# Patient Record
(demographics unavailable — no encounter records)

---

## 2025-07-23 NOTE — HISTORY OF PRESENT ILLNESS
[Diabetes Mellitus] : Diabetes Mellitus [Diet] : controlled with diet [Oral] : controlled with oral diabetes medication [Hypertension] : Hypertension [FreeTextEntry1] : 77y Male from [facility] PMHx CAD (PCI to OM 2016), T2DM, HTN presented initially to [facility] ED on 7/9/25 with exertional chest pain. Patient reported he was experiencing exertional chest pain for the past few months. He was in [place] visiting family when chest pressure started again so family brought him in to [facility] ED. On arrival, troponins negative but EKG with T-wave inversion in III and V1. He was admitted to cardiology for chest pain work-up. Stress test was positive so on 7/10/25; subsequently, he had LHC, which demonstrated severe 3vCAD. 7/15/25 patient underwent CABGx3 (LIMA-LAD, SVG-OM, SVG-PDA). Arrived to CTICU extubated on levo. Mido added. POD 1, CT removed, post PTX seen on CT chest. Right pigtail chest tube placed for PTX. Persisting subQ air of anterior chest. Midodrine discontinued. 2uPRBC gave for Hbg 7 with appropriate response. POD 2, delined. Lopressor started. CXR with persisting bilateral ptx. Transferred to [facility]. POD3, CT was placed to water seal, CT Chest obtained, decision made to remove CT. Evening f/u CXR w/ large PTX and subcutaneous air; R anterior pigtail placed once more. Placed in HFNC overnight for comfort. POD4, remained with pigtail in place to suction with stable subq air. POD5, CT placed to water seal with increased facial and neck edema. CT was placed back on suction -40 overnight. POD6, CT placed to water seal. CT removed and pt discharged home 7/22/25. Mr. Anderson is recovering at his son's home in [place]. He is ambulating independently.  Pt verbalized incisional pain controlled with prn Acetaminophen. Pt slept well and has a good appetite.  Last BM today. He verbalized urinating frequently and denies LE swelling.  Pt does not have a glucose monitor here, left his back home in [facility].

## 2025-07-23 NOTE — REASON FOR VISIT
[Other Location: e.g. School (Enter Location, City,State)___] : at [unfilled], at the time of the visit. [Other Location: e.g. Home (Enter Location, City,State)___] : at [unfilled] [Telehealth (audio & video)] : This visit was provided via telehealth using real-time 2-way audio visual technology. [Verbal consent obtained from patient] : the patient, [unfilled] [Follow-Up: _____] : a [unfilled] follow-up visit [Spouse] : spouse [FreeTextEntry2] : 7/15/25

## 2025-07-23 NOTE — ASSESSMENT
[FreeTextEntry1] : S/p CABG x3- continue Asa, Atorvastatin, Furosemide, and Metoprolol Tartrate.  DM- continue consistent carb diet and Metformin. Will send Rx for glucose monitor to check blood glucose fasting daily.

## 2025-07-23 NOTE — PLAN
[TextEntry] : Post Operative Care:  Pt advised of importance of daily weights. Pt advised to call FY NP with weight gain of 3 lbs or more. Pt instructed on how to use incentive spirometer every hour, demonstrated proper use.  Pt encouraged to ambulate as much as tolerated, avoiding extreme temperatures outdoors.  Also advised to cleanse incisions daily with mild soap and water and to avoid lotions, powders, ointments or creams near or on the incision.  Low salt, low fat diet encouraged and discussed.  Pt advised to avoid heavy lifting or straining.     [facility] team will continue to follow up with pt status.  NP/CCC roles explained with pt understanding, contact information provided. Pt agrees to call with any questions, issues or concerns.  Worsening symptoms reviewed with patient understanding.      FOLLOW UP APPOINTMENTS:  CTS: Dr. Pickens appointment 7/28/25  CARDIOLOGIST: In [facility] follow up on return home  PCP: Pt encouraged to follow up within one month of discharge

## 2025-07-23 NOTE — PHYSICAL EXAM
[Sclera] : the sclera and conjunctiva were normal [PERRL With Normal Accommodation] : pupils were equal in size, round, and reactive to light [Neck Appearance] : the appearance of the neck was normal [Respiration, Rhythm And Depth] : normal respiratory rhythm and effort [Exaggerated Use Of Accessory Muscles For Inspiration] : no accessory muscle use [Examination Of The Chest] : the chest was normal in appearance [Chest Visual Inspection Thoracic Asymmetry] : no chest asymmetry [Diminished Respiratory Excursion] : normal chest expansion [Breast Appearance] : normal in appearance [Nail Clubbing] : no clubbing  or cyanosis of the fingernails [Involuntary Movements] : no involuntary movements were seen [Skin Color & Pigmentation] : normal skin color and pigmentation [Skin Turgor] : normal skin turgor [] : no rash [FreeTextEntry1] : right upper chest dressing with serosanguinous drainage noted on gauze. CT sites CDI. left SVG site CDI. B/L LE edema [No Focal Deficits] : no focal deficits [Oriented To Time, Place, And Person] : oriented to person, place, and time [Impaired Insight] : insight and judgment were intact [Affect] : the affect was normal [Mood] : the mood was normal [Memory Recent] : recent memory was not impaired [Memory Remote] : remote memory was not impaired

## 2025-07-29 NOTE — REASON FOR VISIT
[de-identified] : CABGx3 (LIMA-LAD, SVG-OM, SVG-PDA)  [de-identified] : 7/15/25 [de-identified] : 13 [de-identified] : Arrived to CTICU extubated on levo. Mido added. POD 1, CT removed, post PTX seen on CT chest. Right pigtail chest tube placed for PTX. Persisting subQ air of anterior chest. Midodrine discontinued. 2uPRBC gave for Hbg 7 with appropriate response. POD 2, delined. Lopressor started. CXR with persisting bilateral PTX. Transferred to [facility]. POD3, CT was placed to water seal, CT Chest obtained, decision made to remove CT. Evening f/u CXR revealed large PTX and subcutaneous air; R anterior pigtail placed once more. Placed in HFNC overnight for comfort. POD4, remained with pigtail in place to suction with stable subq air. POD5, CT placed to water seal with increased facial and neck edema. CT was placed back on suction -40 overnight. POD6, CT placed to water seal. F/u CXR revealed stable subcutaneous air. Remained on water seal overnight. POD7, AM CXR showed stable subq emphysema and CT was placed on clamp trial. After 2 hours, CXR revealed no progression in subq emphysema and CT was subsequently removed. Afternoon CXR revealed stable subq emphysema, reviewed with Dr. Haywood. Pacing wires were removed without arrhythmia or complication. Per Dr. Pickens, patient is hemodynamically stable to be discharged. Upon discharge, patient is eating well, tolerating pain, ambulating, and having bowel movements. Patient discharged home on 7/22/25.   Patient is recuperating well from surgery. He is ambulating and increasing his activities daily. Patient denies fever, chills, dizziness, syncope, shortness of breath, chest pain, palpitations or peripheral edema.  [Spouse] : spouse

## 2025-07-29 NOTE — ASSESSMENT
[FreeTextEntry1] : - Follow up with PCP/Cardiologist Dr. Ajay Vieyra.  - Continue current medication regimen. - Continue to increase activity and walk daily as tolerated. Continue to use incentive spirometer.  - No driving or strenuous activity for six weeks after surgery. Avoid lifting >10 to 15lbs for first two months after surgery.  - Continue to use compression stockings. Keep legs elevated above heart when resting/sitting/sleeping. - Call MD if you experience fever, fatigue, dizziness, confusion, syncope, shortness of breath, chest pain not relieved with analgesics, increased redness/drainage from incision. - Follow up in CTS clinic in 2 WEEKS WITH CHEST XRAY.

## 2025-07-29 NOTE — REASON FOR VISIT
[de-identified] : CABGx3 (LIMA-LAD, SVG-OM, SVG-PDA)  [de-identified] : 7/15/25 [de-identified] : 13 [de-identified] : Arrived to CTICU extubated on levo. Mido added. POD 1, CT removed, post PTX seen on CT chest. Right pigtail chest tube placed for PTX. Persisting subQ air of anterior chest. Midodrine discontinued. 2uPRBC gave for Hbg 7 with appropriate response. POD 2, delined. Lopressor started. CXR with persisting bilateral PTX. Transferred to [facility]. POD3, CT was placed to water seal, CT Chest obtained, decision made to remove CT. Evening f/u CXR revealed large PTX and subcutaneous air; R anterior pigtail placed once more. Placed in HFNC overnight for comfort. POD4, remained with pigtail in place to suction with stable subq air. POD5, CT placed to water seal with increased facial and neck edema. CT was placed back on suction -40 overnight. POD6, CT placed to water seal. F/u CXR revealed stable subcutaneous air. Remained on water seal overnight. POD7, AM CXR showed stable subq emphysema and CT was placed on clamp trial. After 2 hours, CXR revealed no progression in subq emphysema and CT was subsequently removed. Afternoon CXR revealed stable subq emphysema, reviewed with Dr. Haywood. Pacing wires were removed without arrhythmia or complication. Per Dr. Pickens, patient is hemodynamically stable to be discharged. Upon discharge, patient is eating well, tolerating pain, ambulating, and having bowel movements. Patient discharged home on 7/22/25.   Patient is recuperating well from surgery. He is ambulating and increasing his activities daily. Patient denies fever, chills, dizziness, syncope, shortness of breath, chest pain, palpitations or peripheral edema.  [Spouse] : spouse

## 2025-07-29 NOTE — PHYSICAL EXAM
[] : no respiratory distress [Auscultation Breath Sounds / Voice Sounds] : lungs were clear to auscultation bilaterally [Heart Rate And Rhythm] : heart rate was normal and rhythm regular [Heart Sounds] : normal S1 and S2 [Heart Sounds Gallop] : no gallops [Murmurs] : no murmurs [Heart Sounds Pericardial Friction Rub] : no pericardial rub [Clean] : clean [Dry] : dry [Healing Well] : healing well [FreeTextEntry5] : LEFT EVH  [___ +] : left pretibial [unfilled]U+ pretibial pitting edema